# Patient Record
Sex: FEMALE | Employment: OTHER | URBAN - METROPOLITAN AREA
[De-identification: names, ages, dates, MRNs, and addresses within clinical notes are randomized per-mention and may not be internally consistent; named-entity substitution may affect disease eponyms.]

---

## 2017-05-01 ENCOUNTER — APPOINTMENT (OUTPATIENT)
Dept: CT IMAGING | Age: 47
End: 2017-05-01
Attending: NURSE PRACTITIONER
Payer: COMMERCIAL

## 2017-05-01 ENCOUNTER — APPOINTMENT (OUTPATIENT)
Dept: GENERAL RADIOLOGY | Age: 47
End: 2017-05-01
Attending: NURSE PRACTITIONER
Payer: COMMERCIAL

## 2017-05-01 ENCOUNTER — HOSPITAL ENCOUNTER (EMERGENCY)
Age: 47
Discharge: HOME | End: 2017-05-01
Attending: EMERGENCY MEDICINE
Payer: COMMERCIAL

## 2017-05-01 VITALS
SYSTOLIC BLOOD PRESSURE: 126 MMHG | HEART RATE: 78 BPM | WEIGHT: 142 LBS | OXYGEN SATURATION: 97 % | HEIGHT: 65 IN | RESPIRATION RATE: 16 BRPM | TEMPERATURE: 98.2 F | BODY MASS INDEX: 23.66 KG/M2 | DIASTOLIC BLOOD PRESSURE: 80 MMHG

## 2017-05-01 DIAGNOSIS — M79.604 ACUTE LEG PAIN, RIGHT: ICD-10-CM

## 2017-05-01 DIAGNOSIS — R68.84 JAW PAIN: ICD-10-CM
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
## 2017-05-01 DIAGNOSIS — S00.81XA FOREHEAD ABRASION, INITIAL ENCOUNTER: ICD-10-CM

## 2017-05-01 DIAGNOSIS — S09.90XA CHI (CLOSED HEAD INJURY), INITIAL ENCOUNTER: ICD-10-CM

## 2017-05-01 PROCEDURE — 70450 CT HEAD/BRAIN W/O DYE: CPT

## 2017-05-01 PROCEDURE — 74011250637 HC RX REV CODE- 250/637: Performed by: NURSE PRACTITIONER

## 2017-05-01 PROCEDURE — 99284 EMERGENCY DEPT VISIT MOD MDM: CPT

## 2017-05-01 PROCEDURE — 70486 CT MAXILLOFACIAL W/O DYE: CPT

## 2017-05-01 PROCEDURE — 72050 X-RAY EXAM NECK SPINE 4/5VWS: CPT

## 2017-05-01 PROCEDURE — 72125 CT NECK SPINE W/O DYE: CPT

## 2017-05-01 PROCEDURE — 73590 X-RAY EXAM OF LOWER LEG: CPT

## 2017-05-01 RX ORDER — HYDROCODONE BITARTRATE AND ACETAMINOPHEN 5; 325 MG/1; MG/1
1 TABLET ORAL ONCE
Status: COMPLETED | OUTPATIENT
Start: 2017-05-01 | End: 2017-05-01

## 2017-05-01 RX ORDER — CYCLOBENZAPRINE HCL 10 MG
10 TABLET ORAL
Qty: 15 TAB | Refills: 0 | Status: SHIPPED | OUTPATIENT
Start: 2017-05-01 | End: 2017-09-12

## 2017-05-01 RX ORDER — HYDROCODONE BITARTRATE AND ACETAMINOPHEN 5; 325 MG/1; MG/1
1 TABLET ORAL
Qty: 15 TAB | Refills: 0 | Status: SHIPPED | OUTPATIENT
Start: 2017-05-01 | End: 2017-05-12 | Stop reason: ALTCHOICE

## 2017-05-01 RX ORDER — MELATONIN
2000 DAILY
COMMUNITY
End: 2022-03-22 | Stop reason: ALTCHOICE

## 2017-05-01 RX ORDER — ACETAMINOPHEN 325 MG/1
TABLET ORAL
COMMUNITY
End: 2017-09-12

## 2017-05-01 RX ADMIN — HYDROCODONE BITARTRATE AND ACETAMINOPHEN 1 TABLET: 5; 325 TABLET ORAL at 12:24
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
## 2017-05-05 ENCOUNTER — OFFICE VISIT (OUTPATIENT)
Dept: INTERNAL MEDICINE CLINIC | Age: 47
End: 2017-05-05

## 2017-05-05 VITALS
DIASTOLIC BLOOD PRESSURE: 70 MMHG | OXYGEN SATURATION: 99 % | WEIGHT: 142 LBS | SYSTOLIC BLOOD PRESSURE: 120 MMHG | BODY MASS INDEX: 24.24 KG/M2 | TEMPERATURE: 97.4 F | HEIGHT: 64 IN | HEART RATE: 90 BPM
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
## 2017-05-05 DIAGNOSIS — S06.0X0A CONCUSSION, WITHOUT LOC, INITIAL ENCOUNTER: Primary | ICD-10-CM
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
## 2017-05-05 NOTE — LETTER
NOTIFICATION RETURN TO WORK / SCHOOL 
 
5/5/2017 10:26 AM 
 
Ms. Salud Ibanez [address] [place] [id number] To Whom It May Concern: 
 
Salud Ibanez is currently under the care of Hoa Gillespie. She will return to work/school on: May 15, 2017 If there are questions or concerns please have the patient contact our office. Sincerely, Jose Andres MD

## 2017-05-11 ENCOUNTER — OFFICE VISIT (OUTPATIENT)
Dept: INTERNAL MEDICINE CLINIC | Age: 47
End: 2017-05-11

## 2017-05-11 VITALS
WEIGHT: 144 LBS | HEIGHT: 64 IN | SYSTOLIC BLOOD PRESSURE: 122 MMHG | BODY MASS INDEX: 24.59 KG/M2 | RESPIRATION RATE: 18 BRPM | DIASTOLIC BLOOD PRESSURE: 70 MMHG | OXYGEN SATURATION: 99 % | HEART RATE: 69 BPM | TEMPERATURE: 97.9 F

## 2017-05-11 DIAGNOSIS — M54.2 NECK PAIN: Primary | ICD-10-CM

## 2017-05-11 DIAGNOSIS — S06.0X0D CONCUSSION, WITHOUT LOC, SUBSEQUENT ENCOUNTER: ICD-10-CM
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
## 2017-09-12 ENCOUNTER — OFFICE VISIT (OUTPATIENT)
Dept: INTERNAL MEDICINE CLINIC | Age: 47
End: 2017-09-12

## 2017-09-12 VITALS
WEIGHT: 145 LBS | HEIGHT: 65 IN | BODY MASS INDEX: 24.16 KG/M2 | OXYGEN SATURATION: 98 % | DIASTOLIC BLOOD PRESSURE: 86 MMHG | HEART RATE: 80 BPM | TEMPERATURE: 97.6 F | RESPIRATION RATE: 13 BRPM | SYSTOLIC BLOOD PRESSURE: 108 MMHG

## 2017-09-12 DIAGNOSIS — Z00.00 ROUTINE GENERAL MEDICAL EXAMINATION AT A HEALTH CARE FACILITY: Primary | ICD-10-CM

## 2017-09-12 PROBLEM — Z87.820 HISTORY OF CONCUSSION: Status: ACTIVE | Noted: 2017-09-12

## 2017-09-12 RX ORDER — LANOLIN ALCOHOL/MO/W.PET/CERES
800 CREAM (GRAM) TOPICAL DAILY
COMMUNITY
End: 2021-01-27 | Stop reason: ALTCHOICE

## 2017-09-12 RX ORDER — RIBOFLAVIN (VITAMIN B2) 400 MG
TABLET ORAL
COMMUNITY
End: 2022-03-22 | Stop reason: ALTCHOICE

## 2017-09-12 RX ORDER — CHLORZOXAZONE 500 MG/1
TABLET ORAL
COMMUNITY
Start: 2017-06-07 | End: 2017-09-12

## 2017-09-12 RX ORDER — AMITRIPTYLINE HYDROCHLORIDE 25 MG/1
TABLET, FILM COATED ORAL
COMMUNITY
Start: 2017-06-07 | End: 2017-09-12

## 2017-09-12 NOTE — PROGRESS NOTES
Complete Physical and Advance Care Planning (pt provided Advance Medical Directive today.)       HPI:  Johnathan Ibanez is a 56y.o. year old female who is here to establish care. She reports the following history and medical concerns: Wt Readings from Last 3 Encounters:   09/12/17 145 lb (65.8 kg)   05/11/17 144 lb (65.3 kg)   05/05/17 142 lb (64.4 kg)      S/p concussion. Saw Dr. Swapna Bergeron. Saw speech therapy. Back at work and doing ok. Saw GYN and increased to Vit d3 2000. Mammogram scheduled in Nov.    Wilda Cagle outside. Back is doing well. Javi Ace. Assessment and Plan        1. Routine general medical examination at a health care facility  Well exam.  Pt doing well with [facility] chiropractor  - METABOLIC PANEL, COMPREHENSIVE; Future  - CBC WITH AUTOMATED DIFF; Future  - LIPID PANEL; Future  - TSH 3RD GENERATION; Future  - URINALYSIS W/ RFLX MICROSCOPIC; Future  - VITAMIN D, 25 HYDROXY; Future        Visit Vitals    /86 (BP 1 Location: Left arm, BP Patient Position: Sitting)    Pulse 80    Temp 97.6 °F (36.4 °C) (Oral)    Resp 13    Ht 5' 5\" (1.651 m)    Wt 145 lb (65.8 kg)    SpO2 98%    BMI 24.13 kg/m2       Historical Data    Past Medical History:   Diagnosis Date    Previous back surgery [address] [place] [address]       Past Surgical History:   Procedure Laterality Date    ABDOMEN SURGERY PROC UNLISTED  2005    gallbladder    HX CHOLECYSTECTOMY      HX GYN  2004    hysterectomy    HX PARTIAL HYSTERECTOMY         Outpatient Encounter Prescriptions as of 9/12/2017   Medication Sig Dispense Refill    omega 3-dha-epa-fish oil (FISH OIL) 100-160-1,000 mg cap Take  by mouth.  riboflavin, vitamin B2, 400 mg tab Take  by mouth.  magnesium oxide (MAG-OX) 400 mg tablet Take 800 mg by mouth daily.  cholecalciferol (VITAMIN D3) 1,000 unit tablet Take 2,000 Units by mouth daily.  ASCORBIC ACID/MULTIVIT-MIN (EMERGEN-C PO) Take  by mouth daily.       [DISCONTINUED] amitriptyline (ELAVIL) 25 mg tablet       [DISCONTINUED] chlorzoxazone (PARAFON FORTE) 500 mg tablet       [DISCONTINUED] acetaminophen (TYLENOL) 325 mg tablet Take  by mouth every four (4) hours as needed for Pain.  [DISCONTINUED] cyclobenzaprine (FLEXERIL) 10 mg tablet Take 1 Tab by mouth three (3) times daily as needed for Muscle Spasm(s). 15 Tab 0     No facility-administered encounter medications on file as of 9/12/2017. Allergies   Allergen Reactions    Morphine Hives     itch        Social History     Social History    Marital status:      Spouse name: N/A    Number of children: N/A    Years of education: N/A     Occupational History    Not on file. Social History Main Topics    Smoking status: Never Smoker    Smokeless tobacco: Never Used    Alcohol use 3.0 oz/week     4 Glasses of wine per week      Comment: 4 glasses/ week    Drug use: No    Sexual activity: No     Other Topics Concern    Not on file     Social History Narrative    ** Merged History Encounter **             family history includes Cancer in her father; Stroke in her mother. Review of Systems   Constitutional: Negative for chills, diaphoresis, fever, malaise/fatigue and weight loss. HENT: Negative for hearing loss. Respiratory: Negative for cough. Cardiovascular: Negative for chest pain. Gastrointestinal: Negative for blood in stool and constipation. Genitourinary: Negative for dysuria, flank pain, frequency and urgency. Musculoskeletal: Negative for myalgias. Skin: Negative for rash. Neurological: Negative for dizziness, weakness and headaches. Endo/Heme/Allergies: Does not bruise/bleed easily. Physical Exam   Constitutional: She is oriented to person, place, and time. She appears well-nourished. No distress. Neck: Carotid bruit is not present. No thyromegaly present. Cardiovascular: Normal rate, regular rhythm and normal heart sounds. Pulmonary/Chest: Effort normal and breath sounds normal. No respiratory distress. She has no wheezes. Abdominal: Soft. Bowel sounds are normal. She exhibits no mass. There is no tenderness. Musculoskeletal: She exhibits no edema or tenderness. Lymphadenopathy:     She has no cervical adenopathy. Neurological: She is alert and oriented to person, place, and time. Skin: Skin is warm and dry. No rash noted. No erythema. Psychiatric: She has a normal mood and affect. Thought content normal.   Nursing note and vitals reviewed. Ortho Exam       Orders Placed This Encounter    METABOLIC PANEL, COMPREHENSIVE     Standing Status:   Future     Standing Expiration Date:   10/17/2018    CBC WITH AUTOMATED DIFF     Standing Status:   Future     Standing Expiration Date:   10/17/2018    LIPID PANEL     Standing Status:   Future     Standing Expiration Date:   10/17/2018    TSH 3RD GENERATION     Standing Status:   Future     Standing Expiration Date:   10/17/2018    URINALYSIS W/ RFLX MICROSCOPIC     Standing Status:   Future     Standing Expiration Date:   10/17/2018    VITAMIN D, 25 HYDROXY     Standing Status:   Future     Standing Expiration Date:   10/17/2018    DISCONTD: amitriptyline (ELAVIL) 25 mg tablet    DISCONTD: chlorzoxazone (PARAFON FORTE) 500 mg tablet    omega 3-dha-epa-fish oil (FISH OIL) 100-160-1,000 mg cap     Sig: Take  by mouth.  riboflavin, vitamin B2, 400 mg tab     Sig: Take  by mouth.  magnesium oxide (MAG-OX) 400 mg tablet     Sig: Take 800 mg by mouth daily. I have reviewed the patient's medical history in detail and updated the computerized patient record. We had a prolonged discussion about these complex clinical issues and went over the various important aspects to consider. All questions were answered.      Advised her to call back or return to office if symptoms do not improve, change in nature, or persist.    She was given an after visit summary or informed of [employer] Access which includes patient instructions, diagnoses, current medications, & vitals. She expressed understanding with the diagnosis and plan.

## 2017-09-12 NOTE — PROGRESS NOTES
Chief Complaint   Patient presents with    Complete Physical    Advance Care Planning     pt provided Advance Medical Directive today. 1. Have you been to the ER, urgent care clinic since your last visit? Hospitalized since your last visit? No    2. Have you seen or consulted any other health care providers outside of the [address] [place] [address] since your last visit? Include any pap smears or colon screening.  Yes GYN & derm

## 2019-01-21 DIAGNOSIS — Z00.00 ROUTINE GENERAL MEDICAL EXAMINATION AT A HEALTH CARE FACILITY: Primary | ICD-10-CM

## 2019-02-18 ENCOUNTER — OFFICE VISIT (OUTPATIENT)
Dept: INTERNAL MEDICINE CLINIC | Age: 49
End: 2019-02-18

## 2019-02-18 VITALS
HEIGHT: 65 IN | SYSTOLIC BLOOD PRESSURE: 108 MMHG | WEIGHT: 147 LBS | RESPIRATION RATE: 16 BRPM | HEART RATE: 77 BPM | DIASTOLIC BLOOD PRESSURE: 78 MMHG | OXYGEN SATURATION: 98 % | TEMPERATURE: 98.4 F | BODY MASS INDEX: 24.49 KG/M2

## 2019-02-18 DIAGNOSIS — S90.851A: ICD-10-CM

## 2019-02-18 DIAGNOSIS — R79.89 ABNORMAL TSH: ICD-10-CM

## 2019-02-18 DIAGNOSIS — Z00.00 ROUTINE GENERAL MEDICAL EXAMINATION AT A HEALTH CARE FACILITY: Primary | ICD-10-CM

## 2019-02-18 RX ORDER — ATOVAQUONE AND PROGUANIL HYDROCHLORIDE 250; 100 MG/1; MG/1
4 TABLET, FILM COATED ORAL DAILY
Qty: 24 TAB | Refills: 0 | Status: SHIPPED | OUTPATIENT
Start: 2019-02-18 | End: 2021-01-27 | Stop reason: ALTCHOICE

## 2019-02-18 RX ORDER — AZITHROMYCIN 250 MG/1
TABLET, FILM COATED ORAL
Qty: 6 TAB | Refills: 0 | Status: SHIPPED | OUTPATIENT
Start: 2019-02-18 | End: 2019-02-23

## 2019-02-18 NOTE — PROGRESS NOTES
Eldred Bloch Wingate is a 53 y.o. female Chief Complaint Patient presents with  Complete Physical  
 
1. Have you been to the ER, urgent care clinic since your last visit? Hospitalized since your last visit? No 
 
2. Have you seen or consulted any other health care providers outside of the [address] [place] [address] since your last visit? Include any pap smears or colon screening. No  
 
Visit Vitals /78 (BP 1 Location: Left arm, BP Patient Position: Sitting) Pulse 77 Temp 98.4 °F (36.9 °C) (Oral) Resp 16 Ht 5' 5\" (1.651 m) Wt 147 lb (66.7 kg) SpO2 98% BMI 24.46 kg/m² Health Maintenance Due Topic Date Due  Influenza Age 5 to Adult  08/01/2018 Declines influenza vaccine.  
 
Rodri Root LPN

## 2019-02-18 NOTE — PATIENT INSTRUCTIONS
Reviewed:  Symptoms of hypothyroid are fatigue, low appetite, weight gain, slow metabolism, constipation, feeling cold when others are not, heavy periods in females, edema, loss of hair and mental slowness. Overactive thyroid would be the opposite such as restlessness but can still have fatigue, increased appetite, weight loss, loose stools, scant periods in females, feeling hot all of the time. Let us know if any of the above in consistency. Telephone on 01/10/2019 Component Date Value Ref Range Status  WBC 02/12/2019 7.4  3.4 - 10.8 x10E3/uL Final  
 RBC 02/12/2019 4.43  3.77 - 5.28 x10E6/uL Final  
 HGB 02/12/2019 13.7  11.1 - 15.9 g/dL Final  
 HCT 02/12/2019 40.4  34.0 - 46.6 % Final  
 MCV 02/12/2019 91  79 - 97 fL Final  
 MCH 02/12/2019 30.9  26.6 - 33.0 pg Final  
 MCHC 02/12/2019 33.9  31.5 - 35.7 g/dL Final  
 RDW 02/12/2019 13.7  12.3 - 15.4 % Final  
 PLATELET 56/76/3834 495  150 - 379 x10E3/uL Final  
 NEUTROPHILS 02/12/2019 64  Not Estab. % Final  
 Lymphocytes 02/12/2019 26  Not Estab. % Final  
 MONOCYTES 02/12/2019 8  Not Estab. % Final  
 EOSINOPHILS 02/12/2019 2  Not Estab. % Final  
 BASOPHILS 02/12/2019 0  Not Estab. % Final  
 ABS. NEUTROPHILS 02/12/2019 4.7  1.4 - 7.0 x10E3/uL Final  
 Abs Lymphocytes 02/12/2019 1.9  0.7 - 3.1 x10E3/uL Final  
 ABS. MONOCYTES 02/12/2019 0.6  0.1 - 0.9 x10E3/uL Final  
 ABS. EOSINOPHILS 02/12/2019 0.2  0.0 - 0.4 x10E3/uL Final  
 ABS. BASOPHILS 02/12/2019 0.0  0.0 - 0.2 x10E3/uL Final  
 IMMATURE GRANULOCYTES 02/12/2019 0  Not Estab. % Final  
 ABS. IMM.  GRANS. 02/12/2019 0.0  0.0 - 0.1 x10E3/uL Final  
 Cholesterol, total 02/12/2019 165  100 - 199 mg/dL Final  
 Triglyceride 02/12/2019 49  0 - 149 mg/dL Final  
 HDL Cholesterol 02/12/2019 56  >39 mg/dL Final  
 VLDL, calculated 02/12/2019 10  5 - 40 mg/dL Final  
 LDL, calculated 02/12/2019 99  0 - 99 mg/dL Final  
 TSH 02/12/2019 0.444* 0.450 - 4.500 uIU/mL Final  
  Glucose 02/12/2019 83  65 - 99 mg/dL Final  
 BUN 02/12/2019 12  6 - 24 mg/dL Final  
 Creatinine 02/12/2019 0.79  0.57 - 1.00 mg/dL Final  
 GFR est non-AA 02/12/2019 89  >59 mL/min/1.73 Final  
 GFR est AA 02/12/2019 103  >59 mL/min/1.73 Final  
 BUN/Creatinine ratio 02/12/2019 15  9 - 23 Final  
 Sodium 02/12/2019 140  134 - 144 mmol/L Final  
 Potassium 02/12/2019 5.0  3.5 - 5.2 mmol/L Final  
 Chloride 02/12/2019 104  96 - 106 mmol/L Final  
 CO2 02/12/2019 22  20 - 29 mmol/L Final  
 Calcium 02/12/2019 9.7  8.7 - 10.2 mg/dL Final  
 Protein, total 02/12/2019 6.9  6.0 - 8.5 g/dL Final  
 Albumin 02/12/2019 4.4  3.5 - 5.5 g/dL Final  
 GLOBULIN, TOTAL 02/12/2019 2.5  1.5 - 4.5 g/dL Final  
 A-G Ratio 02/12/2019 1.8  1.2 - 2.2 Final  
 Bilirubin, total 02/12/2019 0.6  0.0 - 1.2 mg/dL Final  
 Alk. phosphatase 02/12/2019 80  39 - 117 IU/L Final  
 AST (SGOT) 02/12/2019 19  0 - 40 IU/L Final  
 ALT (SGPT) 02/12/2019 13  0 - 32 IU/L Final  
 Creatinine, urine 02/12/2019 109.8  Not Estab. mg/dL Final  
 Microalbumin, urine 02/12/2019 <3.0  Not Estab. ug/mL Final  
 Microalb/Creat ratio (ug/mg creat.) 02/12/2019 <2.7  0.0 - 30.0 mg/g creat Final  
 Comment:                      Normal:                0.0 -  30.0 Albuminuria:          31.0 - 300.0 Clinical albuminuria:       >300.0  Specific Gravity 02/12/2019 1.019  1.005 - 1.030 Final  
 pH (UA) 02/12/2019 6.5  5.0 - 7.5 Final  
 Color 02/12/2019 Yellow  Yellow Final  
 Appearance 02/12/2019 Clear  Clear Final  
 Leukocyte Esterase 02/12/2019 Negative  Negative Final  
 Protein 02/12/2019 Negative  Negative/Trace Final  
 Glucose 02/12/2019 Negative  Negative Final  
 Ketone 02/12/2019 Negative  Negative Final  
 Blood 02/12/2019 Negative  Negative Final  
 Bilirubin 02/12/2019 Negative  Negative Final  
 Urobilinogen 02/12/2019 0.2  0.2 - 1.0 mg/dL Final  
  Nitrites 02/12/2019 Negative  Negative Final  
 Microscopic Examination 02/12/2019 Comment   Final  
 Microscopic follows if indicated.  Microscopic exam 02/12/2019 See additional order   Final  
 Microscopic was indicated and was performed.  URINALYSIS REFLEX 02/12/2019 Comment   Final  
 This specimen will not reflex to a Urine Culture.  WBC 02/12/2019 0-5  0 - 5 /hpf Final  
 RBC 02/12/2019 0-2  0 - 2 /hpf Final  
 Epithelial cells 02/12/2019 0-10  0 - 10 /hpf Final  
 Casts 02/12/2019 None seen  None seen /lpf Final  
 Mucus 02/12/2019 Present  Not Estab. Final  
 Bacteria 02/12/2019 Few  None seen/Few Final  
 INTERPRETATION 02/12/2019 Note   Final  
 Supplemental report is available. NUUN electrolyte tabs Align probiotic Take 4 tablets of malarone for 3 days per treatment

## 2020-11-02 ENCOUNTER — TELEPHONE (OUTPATIENT)
Dept: INTERNAL MEDICINE CLINIC | Age: 50
End: 2020-11-02

## 2020-11-02 NOTE — TELEPHONE ENCOUNTER
----- Message from Austin Dickey sent at 11/2/2020 11:59 AM EST -----  Regarding: Dr. Glen Johnston  Appointment not available    Caller's first and last name and relationship to patient (if not the patient): pt      Best contact number: [phone]      Preferred date and time: Soon      Scheduled appointment date and time: None found      Reason for appointment: NP appt      Details to clarify the request: Requesting a virtual appt      Message copied/pasted from [facility]

## 2021-01-26 PROBLEM — R79.89 LOW TSH LEVEL: Status: ACTIVE | Noted: 2021-01-26

## 2021-01-27 ENCOUNTER — VIRTUAL VISIT (OUTPATIENT)
Dept: INTERNAL MEDICINE CLINIC | Age: 51
End: 2021-01-27
Payer: MEDICAID

## 2021-01-27 DIAGNOSIS — R79.89 LOW TSH LEVEL: ICD-10-CM

## 2021-01-27 DIAGNOSIS — L30.9 DERMATITIS: ICD-10-CM

## 2021-01-27 DIAGNOSIS — Z12.11 COLON CANCER SCREENING: ICD-10-CM

## 2021-01-27 DIAGNOSIS — Z00.00 ROUTINE GENERAL MEDICAL EXAMINATION AT A HEALTH CARE FACILITY: Primary | ICD-10-CM

## 2021-01-27 PROBLEM — N80.9 ENDOMETRIOSIS: Status: ACTIVE | Noted: 2021-01-27

## 2021-01-27 PROCEDURE — 99386 PREV VISIT NEW AGE 40-64: CPT | Performed by: INTERNAL MEDICINE

## 2021-01-27 RX ORDER — CETIRIZINE HCL 10 MG
10 TABLET ORAL
COMMUNITY
End: 2022-03-22 | Stop reason: ALTCHOICE

## 2021-01-27 NOTE — PATIENT INSTRUCTIONS
This is an established visit conducted via telemedicine. The patient has been instructed that this meets HIPAA criteria and acknowledges and agrees to this method of visitation.  
 
Radha Pascual [place] 
87/91/97 
9:87 AM
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
## 2021-02-06 LAB
ALBUMIN SERPL-MCNC: 4.6 G/DL (ref 3.8–4.8)
ALBUMIN/GLOB SERPL: 1.6 {RATIO} (ref 1.2–2.2)
ALP SERPL-CCNC: 95 IU/L (ref 39–117)
ALT SERPL-CCNC: 21 IU/L (ref 0–32)
AST SERPL-CCNC: 29 IU/L (ref 0–40)
BILIRUB SERPL-MCNC: 0.6 MG/DL (ref 0–1.2)
BUN SERPL-MCNC: 20 MG/DL (ref 6–24)
BUN/CREAT SERPL: 23 (ref 9–23)
CALCIUM SERPL-MCNC: 10.1 MG/DL (ref 8.7–10.2)
CHLORIDE SERPL-SCNC: 101 MMOL/L (ref 96–106)
CHOLEST SERPL-MCNC: 220 MG/DL (ref 100–199)
CO2 SERPL-SCNC: 22 MMOL/L (ref 20–29)
CREAT SERPL-MCNC: 0.87 MG/DL (ref 0.57–1)
ERYTHROCYTE [DISTWIDTH] IN BLOOD BY AUTOMATED COUNT: 11.6 % (ref 11.7–15.4)
GLOBULIN SER CALC-MCNC: 2.9 G/DL (ref 1.5–4.5)
GLUCOSE SERPL-MCNC: 71 MG/DL (ref 65–99)
HCT VFR BLD AUTO: 42.4 % (ref 34–46.6)
HDLC SERPL-MCNC: 59 MG/DL
HGB BLD-MCNC: 14.7 G/DL (ref 11.1–15.9)
LDLC SERPL CALC-MCNC: 151 MG/DL (ref 0–99)
MCH RBC QN AUTO: 31.5 PG (ref 26.6–33)
MCHC RBC AUTO-ENTMCNC: 34.7 G/DL (ref 31.5–35.7)
MCV RBC AUTO: 91 FL (ref 79–97)
PLATELET # BLD AUTO: 217 X10E3/UL (ref 150–450)
POTASSIUM SERPL-SCNC: 4.8 MMOL/L (ref 3.5–5.2)
PROT SERPL-MCNC: 7.5 G/DL (ref 6–8.5)
RBC # BLD AUTO: 4.67 X10E6/UL (ref 3.77–5.28)
SODIUM SERPL-SCNC: 138 MMOL/L (ref 134–144)
TRIGL SERPL-MCNC: 56 MG/DL (ref 0–149)
TSH SERPL DL<=0.005 MIU/L-ACNC: 0.56 UIU/ML (ref 0.45–4.5)
VLDLC SERPL CALC-MCNC: 10 MG/DL (ref 5–40)
WBC # BLD AUTO: 7.1 X10E3/UL (ref 3.4–10.8)

## 2021-02-09 ENCOUNTER — DOCUMENTATION ONLY (OUTPATIENT)
Dept: INTERNAL MEDICINE CLINIC | Age: 51
End: 2021-02-09

## 2021-02-09 NOTE — ACP (ADVANCE CARE PLANNING)
Patient mailed copy of her ACP to be filed in her chart. Copy given to Lucille Melendez to Scan in her chart.

## 2022-03-18 PROBLEM — N80.9 ENDOMETRIOSIS: Status: ACTIVE | Noted: 2021-01-27

## 2022-03-19 PROBLEM — Z87.820 HISTORY OF CONCUSSION: Status: ACTIVE | Noted: 2017-09-12

## 2022-03-19 PROBLEM — R79.89 LOW TSH LEVEL: Status: ACTIVE | Noted: 2021-01-26

## 2022-03-22 ENCOUNTER — OFFICE VISIT (OUTPATIENT)
Dept: INTERNAL MEDICINE CLINIC | Age: 52
End: 2022-03-22
Payer: MEDICAID

## 2022-03-22 VITALS
DIASTOLIC BLOOD PRESSURE: 70 MMHG | WEIGHT: 140 LBS | HEIGHT: 65 IN | SYSTOLIC BLOOD PRESSURE: 110 MMHG | RESPIRATION RATE: 16 BRPM | BODY MASS INDEX: 23.32 KG/M2 | TEMPERATURE: 97.3 F | OXYGEN SATURATION: 100 % | HEART RATE: 65 BPM

## 2022-03-22 DIAGNOSIS — Z20.828 EXPOSURE TO MEASLES VIRUS: ICD-10-CM

## 2022-03-22 DIAGNOSIS — E78.00 PURE HYPERCHOLESTEROLEMIA: ICD-10-CM

## 2022-03-22 DIAGNOSIS — Z12.11 COLON CANCER SCREENING: ICD-10-CM

## 2022-03-22 DIAGNOSIS — Z00.00 ROUTINE GENERAL MEDICAL EXAMINATION AT A HEALTH CARE FACILITY: Primary | ICD-10-CM

## 2022-03-22 DIAGNOSIS — R63.4 WEIGHT LOSS: ICD-10-CM

## 2022-03-22 PROCEDURE — 99396 PREV VISIT EST AGE 40-64: CPT | Performed by: INTERNAL MEDICINE

## 2022-03-22 NOTE — PROGRESS NOTES
1. \"Have you been to the ER, urgent care clinic since your last visit? Hospitalized since your last visit? \" No    2. \"Have you seen or consulted any other health care providers outside of the [address] [place] [address] since your last visit? \"  No     3. For patients aged 39-70: Has the patient had a colonoscopy / FIT/ Cologuard? No      If the patient is female:    4. For patients aged 41-77: Has the patient had a mammogram within the past 2 years? Yes, [facility]'s 3-11-22      5. For patients aged 21-65: Has the patient had a pap smear?   Americo Mercedes, [facility] March 11,2022

## 2022-03-22 NOTE — PATIENT INSTRUCTIONS
Office Policies    Phone calls/patient messages:            Please allow up to 24 hours for someone in the office to contact you about your call or message. Be mindful your provider may be out of the office or your message may require further review. We encourage you to use "[employer] for your messages as this is a faster, more efficient way to communicate with our office                         Medication Refills:            Prescription medications require 48-72 business hours to process. We encourage you to use "[employer] for your refills. For controlled medications: Please allow 72 business hours to process. Certain medications may require you to  a written prescription at our office. NO narcotic/controlled medications will be prescribed after 4pm Monday through Friday or on weekends              Form/Paperwork Completion:            Please note a $25 fee may incur for all paperwork for completed by our providers. We ask that you allow 7-10 business days. Pre-payment is due prior to picking up/faxing the completed form. You may also download your forms to "[employer] to have your doctor print off.
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
## 2022-03-24 ENCOUNTER — APPOINTMENT (OUTPATIENT)
Dept: INTERNAL MEDICINE CLINIC | Age: 52
End: 2022-03-24

## 2022-03-25 LAB
ALBUMIN SERPL-MCNC: 4.9 G/DL (ref 3.8–4.9)
ALBUMIN/GLOB SERPL: 1.7 {RATIO} (ref 1.2–2.2)
ALP SERPL-CCNC: 93 IU/L (ref 44–121)
ALT SERPL-CCNC: 20 IU/L (ref 0–32)
AST SERPL-CCNC: 27 IU/L (ref 0–40)
BILIRUB SERPL-MCNC: 0.5 MG/DL (ref 0–1.2)
BUN SERPL-MCNC: 22 MG/DL (ref 6–24)
BUN/CREAT SERPL: 25 (ref 9–23)
CALCIUM SERPL-MCNC: 10.2 MG/DL (ref 8.7–10.2)
CHLORIDE SERPL-SCNC: 101 MMOL/L (ref 96–106)
CHOLEST SERPL-MCNC: 218 MG/DL (ref 100–199)
CO2 SERPL-SCNC: 21 MMOL/L (ref 20–29)
CREAT SERPL-MCNC: 0.87 MG/DL (ref 0.57–1)
EGFR: 81 ML/MIN/1.73
ERYTHROCYTE [DISTWIDTH] IN BLOOD BY AUTOMATED COUNT: 12.8 % (ref 11.7–15.4)
GLOBULIN SER CALC-MCNC: 2.9 G/DL (ref 1.5–4.5)
GLUCOSE SERPL-MCNC: 83 MG/DL (ref 65–99)
HCT VFR BLD AUTO: 46.3 % (ref 34–46.6)
HDLC SERPL-MCNC: 68 MG/DL
HGB BLD-MCNC: 15.4 G/DL (ref 11.1–15.9)
LDLC SERPL CALC-MCNC: 143 MG/DL (ref 0–99)
MCH RBC QN AUTO: 30.7 PG (ref 26.6–33)
MCHC RBC AUTO-ENTMCNC: 33.3 G/DL (ref 31.5–35.7)
MCV RBC AUTO: 92 FL (ref 79–97)
MEV IGG SER IA-ACNC: 182 AU/ML
PLATELET # BLD AUTO: 238 X10E3/UL (ref 150–450)
POTASSIUM SERPL-SCNC: 4.7 MMOL/L (ref 3.5–5.2)
PROT SERPL-MCNC: 7.8 G/DL (ref 6–8.5)
RBC # BLD AUTO: 5.01 X10E6/UL (ref 3.77–5.28)
SODIUM SERPL-SCNC: 140 MMOL/L (ref 134–144)
TRIGL SERPL-MCNC: 41 MG/DL (ref 0–149)
TSH SERPL DL<=0.005 MIU/L-ACNC: 0.68 UIU/ML (ref 0.45–4.5)
VLDLC SERPL CALC-MCNC: 7 MG/DL (ref 5–40)
WBC # BLD AUTO: 5.2 X10E3/UL (ref 3.4–10.8)

## 2022-03-27 NOTE — PROGRESS NOTES
Complete Physical 
  
HPI: 
Charu Henao is a 53y.o. year old female who is here for a physical: She reports the following history and medical concerns:   
 
Abnormal TSH at 0.444 Left foot- flip flips on. Itching a lot. Area on right foot on bottom. Doesn't remember trauma. Happened in [place]. Showed picture of a wound with yellow center and white surrounding. It healed on own. Now red but still itching Reviewed labs Will live in [place]. Uses mosquito net Has doctor that lives around there. Assessment and Plan 1. Routine general medical examination at a health care facility Well exam.  Pt lives in [place]. Gave rx for antibiotics zpack and treatment dose for malaria 2. Abnormal TSH No sx. Reviewed. Just slightly low TSH. Gave sx list 
 
3. Superficial foreign body of right foot without major open wound and without infection, initial encounter Area cleaned with alcohol. With scalpel area scraped. Old callus scar. No identifiable foreign body. No pain when pressure dark red spot likely blister that hardened. Call if not healed before she leaves. Visit Vitals /78 (BP 1 Location: Left arm, BP Patient Position: Sitting) Pulse 77 Temp 98.4 °F (36.9 °C) (Oral) Resp 16 Ht 5' 5\" (1.651 m) Wt 147 lb (66.7 kg) SpO2 98% BMI 24.46 kg/m² Historical Data Past Medical History:  
Diagnosis Date  Previous back surgery 7 [facility] Past Surgical History:  
Procedure Laterality Date  ABDOMEN SURGERY PROC UNLISTED  2005  
 gallbladder  HX CHOLECYSTECTOMY [facility]  2004  
 hysterectomy  HX PARTIAL HYSTERECTOMY Outpatient Encounter Medications as of 2/18/2019 Medication Sig Dispense Refill  azithromycin (ZITHROMAX) 250 mg tablet TAKE 2 TABS THE FIRST DAY, AND THEN 1 TAB DAILY FOR 4 MORE DAYS 6 Tab 0  
 atovaquone-proguanil (MALARONE) 250-100 mg per tablet Take 4 Tabs by mouth daily.  24 Tab 0  
  omega 3-dha-epa-fish oil (FISH OIL) 100-160-1,000 mg cap Take  by mouth.  riboflavin, vitamin B2, 400 mg tab Take  by mouth.  magnesium oxide (MAG-OX) 400 mg tablet Take 800 mg by mouth daily.  cholecalciferol (VITAMIN D3) 1,000 unit tablet Take 2,000 Units by mouth daily.  ASCORBIC ACID/MULTIVIT-MIN (EMERGEN-C PO) Take  by mouth daily. No facility-administered encounter medications on file as of 2/18/2019. Allergies Allergen Reactions  Morphine Hives  
  itch Social History Socioeconomic History  Marital status:  Spouse name: Not on file  Number of children: Not on file  Years of education: Not on file  Highest education level: Not on file Social Needs  Financial resource strain: Not on file  Food insecurity - worry: Not on file  Food insecurity - inability: Not on file  Transportation needs - medical: Not on file  Transportation needs - non-medical: Not on file Occupational History  Not on file Tobacco Use  Smoking status: Never Smoker  Smokeless tobacco: Never Used Substance and Sexual Activity  Alcohol use: Yes Alcohol/week: 3.0 oz Types: 4 Glasses of wine per week Comment: 4 glasses/ week  Drug use: No  
 Sexual activity: No  
Other Topics Concern  Not on file Social History Narrative ** Merged History Encounter **  
    
  
 
family history includes Cancer in her father; Stroke in her mother. Review of Systems Constitutional: Negative for chills, diaphoresis, fever, malaise/fatigue and weight loss. HENT: Negative for hearing loss. Eyes: Negative for blurred vision. Respiratory: Negative for cough and shortness of breath. Cardiovascular: Negative for chest pain. Gastrointestinal: Negative for abdominal pain, blood in stool and constipation. Genitourinary: Negative for dysuria, flank pain, frequency and urgency. Musculoskeletal: Negative for myalgias. Skin: Negative for rash. Neurological: Negative for dizziness, focal weakness, weakness and headaches. Endo/Heme/Allergies: Negative for environmental allergies. Does not bruise/bleed easily. Physical Exam  
Constitutional: She is oriented to person, place, and time. She appears well-developed and well-nourished. No distress. HENT:  
Mouth/Throat: No oropharyngeal exudate. Eyes: Conjunctivae are normal. Pupils are equal, round, and reactive to light. Left eye exhibits no discharge. No scleral icterus. Neck: No thyromegaly present. Cardiovascular: Normal rate, regular rhythm and normal heart sounds. Exam reveals no gallop and no friction rub. No murmur heard. Pulmonary/Chest: No respiratory distress. Abdominal: She exhibits no distension. Musculoskeletal: Normal range of motion. She exhibits no edema or deformity. Feet: 
 
Feet:  
Right Foot:  
Skin Integrity: Positive for blister, skin breakdown, warmth, callus and dry skin. Negative for ulcer or erythema. Lymphadenopathy:  
  She has no cervical adenopathy. Neurological: She is alert and oriented to person, place, and time. Skin: Skin is warm and dry. No erythema. Psychiatric: She has a normal mood and affect. Judgment and thought content normal.  
Nursing note and vitals reviewed. Ortho Exam  
 
 
Orders Placed This Encounter  azithromycin (ZITHROMAX) 250 mg tablet Sig: TAKE 2 TABS THE FIRST DAY, AND THEN 1 TAB DAILY FOR 4 MORE DAYS Dispense:  6 Tab Refill:  0  
 atovaquone-proguanil (MALARONE) 250-100 mg per tablet Sig: Take 4 Tabs by mouth daily. Dispense:  24 Tab Refill:  0 I have reviewed the patient's medical history in detail and updated the computerized patient record. We had a prolonged discussion about these complex clinical issues and went over the various important aspects to consider. All questions were answered. Advised her to call back or return to office if symptoms do not improve, change in nature, or persist. 
 
She was given an after visit summary or informed of [employer] which includes patient instructions, diagnoses, current medications, & vitals. She expressed understanding with the diagnosis and plan. ISTOP

## 2022-03-30 ENCOUNTER — DOCUMENTATION ONLY (OUTPATIENT)
Dept: INTERNAL MEDICINE CLINIC | Age: 52
End: 2022-03-30

## 2022-03-30 LAB — HEMOCCULT STL QL IA: NEGATIVE

## 2022-11-09 ENCOUNTER — OFFICE VISIT (OUTPATIENT)
Dept: INTERNAL MEDICINE CLINIC | Age: 52
End: 2022-11-09
Payer: MEDICAID

## 2022-11-09 VITALS
HEIGHT: 65 IN | DIASTOLIC BLOOD PRESSURE: 76 MMHG | HEART RATE: 70 BPM | TEMPERATURE: 98.2 F | BODY MASS INDEX: 23.82 KG/M2 | SYSTOLIC BLOOD PRESSURE: 110 MMHG | WEIGHT: 143 LBS | RESPIRATION RATE: 16 BRPM | OXYGEN SATURATION: 100 %

## 2022-11-09 DIAGNOSIS — R22.31 LOCALIZED SWELLING OF FINGER OF RIGHT HAND: Primary | ICD-10-CM

## 2022-11-09 PROCEDURE — 99213 OFFICE O/P EST LOW 20 MIN: CPT | Performed by: STUDENT IN AN ORGANIZED HEALTH CARE EDUCATION/TRAINING PROGRAM

## 2022-11-09 NOTE — PROGRESS NOTES
[facility]   Internal Medicine  [address], [address] [facility]  [address]  [place], [address] [facility]  [phone]      Primary Care Visit Note    Assessment/Plan:     Localized swelling on R 3rd PIP joint: likely ganglion cyst vs. Mucous cyst  - continue observation   - if not improving consider removal      Yariel Yeager MD    CC:     Chief Complaint   Patient presents with    Finger Swelling     Right middle finger       HPI:     Eduardo Ibanez is a 47 y.o. female who presents for evaluation of localized swelling of her R 3rd knuckle. - patient noticed lump on her finger about 2 months ago. She is not sure if it came on gradually or all at once. - has gotten slightly larger over last 2 months.   - had been some redness in the past but not currently. - has some pain with it but minimal today. 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 ROS:   All 10 point review of systems negative except those mentioned in the HPI. Past Medical History: Active Ambulatory Problems     Diagnosis Date Noted    Neck pain 10/27/2016    History of concussion 09/12/2017    Low TSH level 01/26/2021    Endometriosis 01/27/2021    Patient is Oriental orthodox 01/27/2021     Resolved Ambulatory Problems     Diagnosis Date Noted    No Resolved Ambulatory Problems     Past Medical History:   Diagnosis Date    Previous back surgery 1995 & 1999          Current Medications:   No current outpatient medications on file.       Past Surgical History:     Past Surgical History:   Procedure Laterality Date    HX CHOLECYSTECTOMY      HX GYN  2004    hysterectomy    HX ORTHOPAEDIC      back surgery x2 --lumbar fusion and repair    HX PARTIAL HYSTERECTOMY      [place] ABDOMEN SURGERY PROC UNLISTED  2005    gallbladder         Family History:     Family History   Problem Relation Age of Onset    Stroke Mother Dementia Mother     Cancer Father         lung cancer         Social History:     Social History     Socioeconomic History    Marital status:      Spouse name: Not on file    Number of children: 1    Years of education: Not on file    Highest education level: Not on file   Occupational History    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 [employer]   Tobacco Use    Smoking status: Never    Smokeless tobacco: Never   Substance and Sexual Activity    Alcohol use: Yes     Alcohol/week: 5.0 standard drinks     Types: 4 Glasses of wine per week     Comment: 4 glasses/ week    Drug use: No    Sexual activity: Never   Other Topics Concern    Not on file   Social History Narrative    ** Merged History Encounter **          Social Determinants of Health     Financial Resource Strain: Low Risk     Difficulty of Paying Living Expenses: Not very hard   Food Insecurity: No Food Insecurity    Worried About Running Out of Food in the Last Year: Never true    Ran Out of Food in the Last Year: Never true   Transportation Needs: Not on file   Physical Activity: Not on file   Stress: Not on file   Social Connections: Not on file   Intimate Partner Violence: Not on file   Housing Stability: Not on file            Visit Vitals  /76   Pulse 70   Temp 98.2 °F (36.8 °C) (Temporal)   Resp 16   Ht 5' 5\" (1.651 m)   Wt 143 lb (64.9 kg)   SpO2 100%   BMI 23.80 kg/m²       Physical Exam:   General - Well appearing   HEENT - eomi, non-icteric sclera  Pulm - normal wob  Cardio - hemodynamically stable  Abd - benign  Extrem - no edema  MSK: very soft, compressible, mobile mass on dorsal 3rd PIP joint. Minimal pain with palpation, no erythema. 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    Neuro-  Alert and oriented, No focal deficits      Labs/Imaging:     Labs and imaging reviewed by me and significant for:    Lab Results   Component Value Date/Time    WBC 5.2 03/24/2022 08:45 AM    HGB 15.4 03/24/2022 08:45 AM    HCT 46.3 03/24/2022 08:45 AM    PLATELET 353 55/01/5606 08:45 AM    MCV 92 03/24/2022 08:45 AM     Lab Results   Component Value Date/Time    Sodium 140 03/24/2022 08:45 AM    Potassium 4.7 03/24/2022 08:45 AM    Chloride 101 03/24/2022 08:45 AM    CO2 21 03/24/2022 08:45 AM    Glucose 83 03/24/2022 08:45 AM    BUN 22 03/24/2022 08:45 AM    Creatinine 0.87 03/24/2022 08:45 AM    BUN/Creatinine ratio 25 (H) 03/24/2022 08:45 AM    GFR est AA 90 02/05/2021 09:47 AM    GFR est non-AA 78 02/05/2021 09:47 AM    Calcium 10.2 03/24/2022 08:45 AM    Bilirubin, total 0.5 03/24/2022 08:45 AM    Alk.  phosphatase 93 03/24/2022 08:45 AM    Protein, total 7.8 03/24/2022 08:45 AM    Albumin 4.9 03/24/2022 08:45 AM    A-G Ratio 1.7 03/24/2022 08:45 AM    ALT (SGPT) 20 03/24/2022 08:45 AM    AST (SGOT) 27 03/24/2022 08:45 AM

## 2022-11-09 NOTE — PROGRESS NOTES
1. \"Have you been to the ER, urgent care clinic since your last visit? Hospitalized since your last visit? \" No    2. \"Have you seen or consulted any other health care providers outside of the [address] [place] [address] since your last visit? \" No     3. For patients aged 39-70: Has the patient had a colonoscopy / FIT/ Cologuard? Yes - no Care Gap present      If the patient is female:    4. For patients aged 41-77: Has the patient had a mammogram within the past 2 years? Yes - no Care Gap present      5. For patients aged 21-65: Has the patient had a pap smear?  Yes - no Care Gap present

## 2023-05-18 ASSESSMENT — ENCOUNTER SYMPTOMS
EYES NEGATIVE: 1
GASTROINTESTINAL NEGATIVE: 1
RESPIRATORY NEGATIVE: 1

## 2023-05-19 ENCOUNTER — OFFICE VISIT (OUTPATIENT)
Age: 53
End: 2023-05-19
Payer: MEDICAID

## 2023-05-19 VITALS
HEIGHT: 65 IN | DIASTOLIC BLOOD PRESSURE: 76 MMHG | SYSTOLIC BLOOD PRESSURE: 110 MMHG | OXYGEN SATURATION: 99 % | TEMPERATURE: 97.2 F | WEIGHT: 142.6 LBS | BODY MASS INDEX: 23.76 KG/M2 | HEART RATE: 63 BPM | RESPIRATION RATE: 16 BRPM

## 2023-05-19 DIAGNOSIS — Z00.00 ENCOUNTER FOR WELL ADULT EXAM WITHOUT ABNORMAL FINDINGS: ICD-10-CM

## 2023-05-19 DIAGNOSIS — Z11.4 ENCOUNTER FOR SCREENING FOR HIV: ICD-10-CM

## 2023-05-19 DIAGNOSIS — Z11.59 ENCOUNTER FOR HEPATITIS C SCREENING TEST FOR LOW RISK PATIENT: ICD-10-CM

## 2023-05-19 DIAGNOSIS — E78.00 PURE HYPERCHOLESTEROLEMIA: Primary | ICD-10-CM

## 2023-05-19 DIAGNOSIS — H57.12 LEFT EYE PAIN: ICD-10-CM

## 2023-05-19 DIAGNOSIS — Z12.11 COLON CANCER SCREENING: ICD-10-CM

## 2023-05-19 PROCEDURE — 99214 OFFICE O/P EST MOD 30 MIN: CPT | Performed by: INTERNAL MEDICINE

## 2023-05-19 RX ORDER — DESONIDE 0.5 MG/G
OINTMENT TOPICAL
COMMUNITY
Start: 2023-04-26

## 2023-05-19 SDOH — ECONOMIC STABILITY: HOUSING INSECURITY
IN THE LAST 12 MONTHS, WAS THERE A TIME WHEN YOU DID NOT HAVE A STEADY PLACE TO SLEEP OR SLEPT IN A SHELTER (INCLUDING NOW)?: NO

## 2023-05-19 SDOH — ECONOMIC STABILITY: INCOME INSECURITY: HOW HARD IS IT FOR YOU TO PAY FOR THE VERY BASICS LIKE FOOD, HOUSING, MEDICAL CARE, AND HEATING?: NOT HARD AT ALL

## 2023-05-19 SDOH — ECONOMIC STABILITY: FOOD INSECURITY: WITHIN THE PAST 12 MONTHS, THE FOOD YOU BOUGHT JUST DIDN'T LAST AND YOU DIDN'T HAVE MONEY TO GET MORE.: NEVER TRUE

## 2023-05-19 SDOH — ECONOMIC STABILITY: FOOD INSECURITY: WITHIN THE PAST 12 MONTHS, YOU WORRIED THAT YOUR FOOD WOULD RUN OUT BEFORE YOU GOT MONEY TO BUY MORE.: NEVER TRUE

## 2023-05-19 ASSESSMENT — PATIENT HEALTH QUESTIONNAIRE - PHQ9
2. FEELING DOWN, DEPRESSED OR HOPELESS: 0
SUM OF ALL RESPONSES TO PHQ9 QUESTIONS 1 & 2: 0
SUM OF ALL RESPONSES TO PHQ QUESTIONS 1-9: 0
1. LITTLE INTEREST OR PLEASURE IN DOING THINGS: 0
SUM OF ALL RESPONSES TO PHQ QUESTIONS 1-9: 0

## 2023-05-19 NOTE — PROGRESS NOTES
1. \"Have you been to the ER, urgent care clinic since your last visit? Hospitalized since your last visit? \" No    2. \"Have you seen or consulted any other health care providers outside of the [address] [place] [address] since your last visit? \"       Chung Sims 5     3. For patients aged 39-70: Has the patient had a colonoscopy / FIT/ Cologuard? No      If the patient is female:    4. For patients aged 41-77: Has the patient had a mammogram within the past 2 years? Last week// [facility]      5. For patients aged 21-65: Has the patient had a pap smear?   [address] [facility]

## 2023-05-19 NOTE — PROGRESS NOTES
HISTORY OF PRESENT ILLNESS   Alfa Ortiz   is a 47 y.o.  female. Here for CPE-hx endometriosis, mild HLD low tsh, jehovas witness  Gyn DR Telles-serjio last year  Had mammogram at [facility] recently  Her for 2 months then returns to [facility] [place]  Will see Romaine Ballard MD next week for a n area on right back than bled and scabbed over last month  Saw MD at [facility] recently for left eye pain intermittently-treated in [place] with eye drops for ? Subconjunctival hemorrhage Conjunctivitis. Dx dry eyes at [facility]--exam negative per pt but having recurrent left orbital pain. No eye trauma, redness, only mild b/l crusting this AM. Vision ok. She walks a lot in [facility] ([place]  No other complaints    Nonsmoker  No etoh  Has daughter her in [facility] and mother with Alzheimers    Last OV  Last   Gyn MD--saw Dr Mary Carmen Sorensen this month at [facility] pap and mammogram  Will see DERM toda for skin check  On low fat diet  Walks for exercise  No complaints  Ha abx for travelers diarrhea available  nonsmoker , social etoh  Works in [employer] outbreak in [facility] ([place] and advised to get ab testing . Had MMR in 1970's per pt  1 grown child  Patient Active Problem List    Diagnosis Date Noted    Localized swelling of finger of right hand 11/09/2022    Endometriosis 01/27/2021    Patient is Hindu 01/27/2021    Low TSH level 01/26/2021    History of concussion 09/12/2017    Neck pain 10/27/2016     No current outpatient medications on file. No current facility-administered medications for this visit.      Allergies   Allergen Reactions    Morphine Hives     itch        BMP:   Lab Results   Component Value Date/Time     03/24/2022 08:45 AM    K 4.7 03/24/2022 08:45 AM     03/24/2022 08:45 AM    CO2 21 03/24/2022 08:45 AM    BUN 22 03/24/2022 08:45 AM    CREATININE 0.87 03/24/2022 08:45 AM    GLUCOSE 83 03/24/2022 08:45 AM    CALCIUM 10.2 03/24/2022 08:45 AM      CBC:   Lab Results   Component Value

## 2023-05-20 LAB — HIV 1+2 AB+HIV1 P24 AG SERPL QL IA: NON REACTIVE

## 2023-05-23 LAB
ALBUMIN SERPL-MCNC: 4.9 G/DL (ref 3.8–4.9)
ALBUMIN/GLOB SERPL: 1.8 {RATIO} (ref 1.2–2.2)
ALP SERPL-CCNC: 94 IU/L (ref 44–121)
ALT SERPL-CCNC: 17 IU/L (ref 0–32)
AST SERPL-CCNC: 23 IU/L (ref 0–40)
BASOPHILS # BLD AUTO: 0.1 X10E3/UL (ref 0–0.2)
BASOPHILS NFR BLD AUTO: 1 %
BILIRUB SERPL-MCNC: 0.7 MG/DL (ref 0–1.2)
BUN SERPL-MCNC: 20 MG/DL (ref 6–24)
BUN/CREAT SERPL: 24 (ref 9–23)
CALCIUM SERPL-MCNC: 10 MG/DL (ref 8.7–10.2)
CHLORIDE SERPL-SCNC: 99 MMOL/L (ref 96–106)
CHOLEST SERPL-MCNC: 211 MG/DL (ref 100–199)
CO2 SERPL-SCNC: 23 MMOL/L (ref 20–29)
CREAT SERPL-MCNC: 0.85 MG/DL (ref 0.57–1)
EGFRCR SERPLBLD CKD-EPI 2021: 82 ML/MIN/1.73
EOSINOPHIL # BLD AUTO: 0.2 X10E3/UL (ref 0–0.4)
EOSINOPHIL NFR BLD AUTO: 3 %
ERYTHROCYTE [DISTWIDTH] IN BLOOD BY AUTOMATED COUNT: 12.2 % (ref 11.7–15.4)
GLOBULIN SER CALC-MCNC: 2.8 G/DL (ref 1.5–4.5)
GLUCOSE SERPL-MCNC: 82 MG/DL (ref 70–99)
HCT VFR BLD AUTO: 44.6 % (ref 34–46.6)
HCV IGG SERPL QL IA: NON REACTIVE
HDLC SERPL-MCNC: 65 MG/DL
HGB BLD-MCNC: 14.6 G/DL (ref 11.1–15.9)
HIV 1+2 AB+HIV1 P24 AG SERPL QL IA: NON REACTIVE
IMM GRANULOCYTES # BLD AUTO: 0 X10E3/UL (ref 0–0.1)
IMM GRANULOCYTES NFR BLD AUTO: 0 %
LDLC SERPL CALC-MCNC: 138 MG/DL (ref 0–99)
LYMPHOCYTES # BLD AUTO: 2.1 X10E3/UL (ref 0.7–3.1)
LYMPHOCYTES NFR BLD AUTO: 33 %
MCH RBC QN AUTO: 30 PG (ref 26.6–33)
MCHC RBC AUTO-ENTMCNC: 32.7 G/DL (ref 31.5–35.7)
MCV RBC AUTO: 92 FL (ref 79–97)
MONOCYTES # BLD AUTO: 0.5 X10E3/UL (ref 0.1–0.9)
MONOCYTES NFR BLD AUTO: 8 %
NEUTROPHILS # BLD AUTO: 3.5 X10E3/UL (ref 1.4–7)
NEUTROPHILS NFR BLD AUTO: 55 %
PLATELET # BLD AUTO: 274 X10E3/UL (ref 150–450)
POTASSIUM SERPL-SCNC: 4.8 MMOL/L (ref 3.5–5.2)
PROT SERPL-MCNC: 7.7 G/DL (ref 6–8.5)
RBC # BLD AUTO: 4.86 X10E6/UL (ref 3.77–5.28)
SODIUM SERPL-SCNC: 137 MMOL/L (ref 134–144)
TRIGL SERPL-MCNC: 47 MG/DL (ref 0–149)
TSH SERPL DL<=0.005 MIU/L-ACNC: 0.71 UIU/ML (ref 0.45–4.5)
VLDLC SERPL CALC-MCNC: 8 MG/DL (ref 5–40)
WBC # BLD AUTO: 6.3 X10E3/UL (ref 3.4–10.8)

## 2023-05-25 ENCOUNTER — HOSPITAL ENCOUNTER (OUTPATIENT)
Facility: HOSPITAL | Age: 53
Discharge: HOME | End: 2023-05-25
Payer: MEDICAID

## 2023-05-25 DIAGNOSIS — H57.12 LEFT EYE PAIN: ICD-10-CM

## 2023-05-25 PROCEDURE — 70480 CT ORBIT/EAR/FOSSA W/O DYE: CPT

## 2023-06-01 LAB — NONINV COLON CA DNA+OCC BLD SCRN STL QL: NEGATIVE

## 2024-02-12 ENCOUNTER — OFFICE VISIT (OUTPATIENT)
Age: 54
End: 2024-02-12
Payer: MEDICAID

## 2024-02-12 VITALS
WEIGHT: 149.4 LBS | TEMPERATURE: 96.9 F | SYSTOLIC BLOOD PRESSURE: 121 MMHG | HEIGHT: 65 IN | HEART RATE: 78 BPM | OXYGEN SATURATION: 98 % | DIASTOLIC BLOOD PRESSURE: 80 MMHG | BODY MASS INDEX: 24.89 KG/M2 | RESPIRATION RATE: 16 BRPM

## 2024-02-12 DIAGNOSIS — M25.532 CHRONIC WRIST PAIN, LEFT: Primary | ICD-10-CM

## 2024-02-12 DIAGNOSIS — M65.4 DE QUERVAIN'S TENOSYNOVITIS, LEFT: ICD-10-CM

## 2024-02-12 DIAGNOSIS — Z23 ENCOUNTER FOR IMMUNIZATION: ICD-10-CM

## 2024-02-12 DIAGNOSIS — G89.29 CHRONIC WRIST PAIN, LEFT: Primary | ICD-10-CM

## 2024-02-12 PROCEDURE — 99213 OFFICE O/P EST LOW 20 MIN: CPT | Performed by: INTERNAL MEDICINE

## 2024-02-12 RX ORDER — KETOPROFEN 50 MG/1
50 CAPSULE ORAL 2 TIMES DAILY PRN
COMMUNITY

## 2024-02-12 NOTE — PROGRESS NOTES
HISTORY OF PRESENT ILLNESS   Charlene Ortiz   is a 53 y.o.  female.  C/o left medial wrist pain since October 2023  She had been using her left hand to lift a bucket of water for about a week and subsequently  noted the pain    Had Xray  in [place] for fx or OA  Was given nsaids which upset her stomach and did not help   Had some swelling ? Ganglion which resolved with massage at home  No fall or injury per pt  Patient Active Problem List    Diagnosis Date Noted    Localized swelling of finger of right hand 11/09/2022    Endometriosis 01/27/2021    Patient is Hindu 01/27/2021    Low TSH level 01/26/2021    History of concussion 09/12/2017    Neck pain 10/27/2016     Current Outpatient Medications   Medication Sig Dispense Refill    ketoprofen (ORUDIS) 50 MG capsule Take 1 capsule by mouth 2 times daily as needed for Pain      desonide (DESOWEN) 0.05 % ointment prn       No current facility-administered medications for this visit.     Allergies   Allergen Reactions    Morphine Hives     itch        BMP:   Lab Results   Component Value Date/Time     05/19/2023 12:00 AM    K 4.8 05/19/2023 12:00 AM    CL 99 05/19/2023 12:00 AM    CO2 23 05/19/2023 12:00 AM    BUN 20 05/19/2023 12:00 AM    CREATININE 0.85 05/19/2023 12:00 AM    GLUCOSE 82 05/19/2023 12:00 AM    CALCIUM 10.0 05/19/2023 12:00 AM      CBC:   Lab Results   Component Value Date/Time    WBC 6.3 05/19/2023 12:00 AM    RBC 4.86 05/19/2023 12:00 AM    HGB 14.6 05/19/2023 12:00 AM    HCT 44.6 05/19/2023 12:00 AM    MCV 92 05/19/2023 12:00 AM    MCH 30.0 05/19/2023 12:00 AM    MCHC 32.7 05/19/2023 12:00 AM    RDW 12.2 05/19/2023 12:00 AM     05/19/2023 12:00 AM      Lipids   Lab Results   Component Value Date/Time    CHOL 211 05/19/2023 12:00 AM    CHOL 218 03/24/2022 08:45 AM    TRIG 47 05/19/2023 12:00 AM    HDL 65 05/19/2023 12:00 AM    LDLCALC 138 05/19/2023 12:00 AM     Hemoglobin A1C: No results found for: \"LABA1C\", \"[id number]\"

## 2024-02-12 NOTE — PROGRESS NOTES
\"Have you been to the ER, urgent care clinic since your last visit?  Hospitalized since your last visit?\"    NO    “Have you seen or consulted any other health care providers outside of [facility] since your last visit?”    YES - When: approximately November 18 2023 months ago.  Where and Why: [facility]- X-Ray for Left wrist.        “Have you had a pap smear?”    NO

## 2024-05-20 DIAGNOSIS — E78.5 HYPERLIPIDEMIA, UNSPECIFIED HYPERLIPIDEMIA TYPE: Primary | ICD-10-CM

## 2024-05-21 ENCOUNTER — TELEPHONE (OUTPATIENT)
Age: 54
End: 2024-05-21

## 2024-05-24 DIAGNOSIS — E78.5 HYPERLIPIDEMIA, UNSPECIFIED HYPERLIPIDEMIA TYPE: ICD-10-CM

## 2024-05-25 LAB
ALBUMIN SERPL-MCNC: 4.5 G/DL (ref 3.8–4.9)
ALBUMIN/GLOB SERPL: 1.6 {RATIO} (ref 1.2–2.2)
ALP SERPL-CCNC: 95 IU/L (ref 44–121)
ALT SERPL-CCNC: 15 IU/L (ref 0–32)
AST SERPL-CCNC: 20 IU/L (ref 0–40)
BILIRUB SERPL-MCNC: 0.6 MG/DL (ref 0–1.2)
BUN SERPL-MCNC: 17 MG/DL (ref 6–24)
BUN/CREAT SERPL: 20 (ref 9–23)
CALCIUM SERPL-MCNC: 9.5 MG/DL (ref 8.7–10.2)
CHLORIDE SERPL-SCNC: 100 MMOL/L (ref 96–106)
CHOLEST SERPL-MCNC: 200 MG/DL (ref 100–199)
CO2 SERPL-SCNC: 23 MMOL/L (ref 20–29)
CREAT SERPL-MCNC: 0.83 MG/DL (ref 0.57–1)
EGFRCR SERPLBLD CKD-EPI 2021: 84 ML/MIN/1.73
ERYTHROCYTE [DISTWIDTH] IN BLOOD BY AUTOMATED COUNT: 12.4 % (ref 11.7–15.4)
GLOBULIN SER CALC-MCNC: 2.8 G/DL (ref 1.5–4.5)
GLUCOSE SERPL-MCNC: 90 MG/DL (ref 70–99)
HCT VFR BLD AUTO: 43.5 % (ref 34–46.6)
HDLC SERPL-MCNC: 62 MG/DL
HGB BLD-MCNC: 14.3 G/DL (ref 11.1–15.9)
LDLC SERPL CALC-MCNC: 131 MG/DL (ref 0–99)
MCH RBC QN AUTO: 30.1 PG (ref 26.6–33)
MCHC RBC AUTO-ENTMCNC: 32.9 G/DL (ref 31.5–35.7)
MCV RBC AUTO: 92 FL (ref 79–97)
PLATELET # BLD AUTO: 245 X10E3/UL (ref 150–450)
POTASSIUM SERPL-SCNC: 5.1 MMOL/L (ref 3.5–5.2)
PROT SERPL-MCNC: 7.3 G/DL (ref 6–8.5)
RBC # BLD AUTO: 4.75 X10E6/UL (ref 3.77–5.28)
SODIUM SERPL-SCNC: 136 MMOL/L (ref 134–144)
TRIGL SERPL-MCNC: 36 MG/DL (ref 0–149)
TSH SERPL DL<=0.005 MIU/L-ACNC: 0.53 UIU/ML (ref 0.45–4.5)
VLDLC SERPL CALC-MCNC: 7 MG/DL (ref 5–40)
WBC # BLD AUTO: 5.3 X10E3/UL (ref 3.4–10.8)

## 2024-05-25 NOTE — PROGRESS NOTES
HISTORY OF PRESENT ILLNESS   Charlene Ortiz   is a 54 y.o.  female.  Fu hx endometriosis, mild HLD low tsh, jehovas witness  Gyn Dr Telles  Had recent labs       Had recent thyroid us at [facility]--/ nodule or TM  Denies any neck swelling or compressive symptoms    Had mammogram recently-diagnostic mammogram and US right breast tomorrow scheduled  She has not felt any breast lumps  Saw SUSANNA MARAVILLA recently    She is scheduled to return to [facility] in early June  Feels well overall    Walks 7 miles per day in [place]-not as acutv here in [place]    Nosnoker   Etoh occassional    Last OV  Had mammogram at [facility] recently  Her for 2 months then returns to [place] [facility]  Will see SUSANNA MARAVILLA next week for a n area on right back than bled and scabbed over last month  Saw MD at [facility] recently for left eye pain intermittently-treated in [facility] with eye drops for ? Subconjunctival hemorrhage Conjunctivitis. Dx dry eyes at [place] [facility]--exam negative per pt but having recurrent left orbital pain.  No eye trauma, redness, only mild b/l crusting this AM. Vision ok.  She walks a lot in [place]  No other complaints     Nonsmoker  No etoh  Has daughter her in [facility] and mother with Alzheimers  Patient Active Problem List    Diagnosis Date Noted    Localized swelling of finger of right hand 11/09/2022    Endometriosis 01/27/2021    Patient is Denominational 01/27/2021    Low TSH level 01/26/2021    History of concussion 09/12/2017    Neck pain 10/27/2016     Current Outpatient Medications   Medication Sig Dispense Refill    ketoprofen (ORUDIS) 50 MG capsule Take 1 capsule by mouth 2 times daily as needed for Pain      desonide (DESOWEN) 0.05 % ointment prn       No current facility-administered medications for this visit.     Allergies   Allergen Reactions    Morphine Hives     itch        BMP:   Lab Results   Component Value Date/Time     05/24/2024 12:00 AM    K 5.1 05/24/2024 12:00 AM     05/24/2024 12:00 AM

## 2024-05-28 ENCOUNTER — OFFICE VISIT (OUTPATIENT)
Age: 54
End: 2024-05-28
Payer: MEDICAID

## 2024-05-28 VITALS
WEIGHT: 147.8 LBS | DIASTOLIC BLOOD PRESSURE: 82 MMHG | HEIGHT: 65 IN | BODY MASS INDEX: 24.62 KG/M2 | TEMPERATURE: 97.4 F | SYSTOLIC BLOOD PRESSURE: 110 MMHG | OXYGEN SATURATION: 98 % | RESPIRATION RATE: 14 BRPM | HEART RATE: 76 BPM

## 2024-05-28 DIAGNOSIS — R92.8 ABNORMAL MAMMOGRAM OF RIGHT BREAST: ICD-10-CM

## 2024-05-28 DIAGNOSIS — E01.0 THYROMEGALY: ICD-10-CM

## 2024-05-28 DIAGNOSIS — E78.5 HYPERLIPIDEMIA, UNSPECIFIED HYPERLIPIDEMIA TYPE: Primary | ICD-10-CM

## 2024-05-28 PROCEDURE — 99214 OFFICE O/P EST MOD 30 MIN: CPT | Performed by: INTERNAL MEDICINE

## 2024-05-28 ASSESSMENT — ENCOUNTER SYMPTOMS
ALLERGIC/IMMUNOLOGIC NEGATIVE: 1
RESPIRATORY NEGATIVE: 1
GASTROINTESTINAL NEGATIVE: 1
EYES NEGATIVE: 1

## 2024-05-28 NOTE — PROGRESS NOTES
Room:  I have reviewed all needed documentation in preparation for visit. Verified patient by name and date of birth. Rooming procedures conducted per protocol. Reviewed allergies and medications with patient.   Charlene Ortiz is a 54 y.o. female for Annual Exam     Vitals:    05/28/24 1439   BP: 110/82   Pulse: 76   Resp: 14   Temp: 97.4 °F (36.3 °C)   SpO2: 98%         Patient states that she went for a mammogram two weeks ago and she has been asked to come back for a follow up due to something being seen. She is following up tomorrow. Patient went to OBGYN for annual on 5/2 and was told she needed an u/s on thyroid. She got u/s on 5/23 but has not gotten results. Patient went to [facility].       Health Maintenance Due   Topic Date Due    Hepatitis B vaccine (1 of 3 - 3-dose series) Never done    Cervical cancer screen  Never done    Shingles vaccine (2 of 2) 08/01/2023    COVID-19 Vaccine (5 - 2023-24 season) 09/01/2023        \"Have you been to the ER, urgent care clinic since your last visit?  Hospitalized since your last visit?\"    NO    “Have you seen or consulted any other health care providers outside of [facility] since your last visit?”    NO        “Have you had a pap smear?”    YES - Where: 5/2/2024 [facility].  Nurse/CMA to request most recent records if not in the chart    No cervical cancer screening on file
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
## 2025-06-12 NOTE — PROGRESS NOTES
HISTORY OF PRESENT ILLNESS   Charlene Ortiz   is a 55 y.o.  female.  Hx endometriosis, mild HLD low tsh, jehovas witness  Gyn Dr Telles    C/o large knot on right shoulder getting larger and painful  She flew [place] from [place] last week  2 d after reutrning home had mild soreness of left upper chest below the clavicle  The next days a large knot formed  Now the knot is redn and tender-no drainagen  No f/c  She was carrying a back akbar in [place] but no skin breakdown  Has not been in hospitals or long tern care facilities   No hx MRSA skin infections although had a red lesion ? Bite on right thigh recently that resolved    Patient Active Problem List    Diagnosis Date Noted    Localized swelling of finger of right hand 11/09/2022    Endometriosis 01/27/2021    Patient is Buddhism 01/27/2021    Low TSH level 01/26/2021    History of concussion 09/12/2017    Neck pain 10/27/2016     Current Outpatient Medications   Medication Sig Dispense Refill    desonide (DESOWEN) 0.05 % ointment prn       No current facility-administered medications for this visit.     Allergies   Allergen Reactions    Morphine Hives     itch     Social History     Tobacco Use    Smoking status: Never    Smokeless tobacco: Never   Substance Use Topics    Alcohol use: Yes     Alcohol/week: 4.0 standard drinks of alcohol     Types: 4 Glasses of wine per week        BMP:   Lab Results   Component Value Date/Time     05/24/2024 12:00 AM    K 5.1 05/24/2024 12:00 AM     05/24/2024 12:00 AM    CO2 23 05/24/2024 12:00 AM    BUN 17 05/24/2024 12:00 AM    CREATININE 0.83 05/24/2024 12:00 AM    GLUCOSE 90 05/24/2024 12:00 AM    CALCIUM 9.5 05/24/2024 12:00 AM      CBC:   Lab Results   Component Value Date/Time    WBC 5.3 05/24/2024 12:00 AM    RBC 4.75 05/24/2024 12:00 AM    HGB 14.3 05/24/2024 12:00 AM    HCT 43.5 05/24/2024 12:00 AM    MCV 92 05/24/2024 12:00 AM    MCH 30.1 05/24/2024 12:00 AM    MCHC 32.9 05/24/2024 12:00 AM    RDW 12.4

## 2025-06-13 ENCOUNTER — OFFICE VISIT (OUTPATIENT)
Age: 55
End: 2025-06-13
Payer: MEDICAID

## 2025-06-13 VITALS
RESPIRATION RATE: 16 BRPM | SYSTOLIC BLOOD PRESSURE: 110 MMHG | OXYGEN SATURATION: 99 % | TEMPERATURE: 97.9 F | BODY MASS INDEX: 23.89 KG/M2 | DIASTOLIC BLOOD PRESSURE: 76 MMHG | WEIGHT: 143.4 LBS | HEIGHT: 65 IN | HEART RATE: 80 BPM

## 2025-06-13 DIAGNOSIS — E78.00 PURE HYPERCHOLESTEROLEMIA: ICD-10-CM

## 2025-06-13 DIAGNOSIS — L02.213 ABSCESS OF CHEST WALL: Primary | ICD-10-CM

## 2025-06-13 PROCEDURE — 99213 OFFICE O/P EST LOW 20 MIN: CPT | Performed by: INTERNAL MEDICINE

## 2025-06-13 RX ORDER — SULFAMETHOXAZOLE AND TRIMETHOPRIM 800; 160 MG/1; MG/1
1 TABLET ORAL 2 TIMES DAILY
Qty: 14 TABLET | Refills: 0 | Status: SHIPPED | OUTPATIENT
Start: 2025-06-13 | End: 2025-06-20

## 2025-06-13 SDOH — ECONOMIC STABILITY: FOOD INSECURITY: WITHIN THE PAST 12 MONTHS, THE FOOD YOU BOUGHT JUST DIDN'T LAST AND YOU DIDN'T HAVE MONEY TO GET MORE.: NEVER TRUE

## 2025-06-13 SDOH — ECONOMIC STABILITY: FOOD INSECURITY: WITHIN THE PAST 12 MONTHS, YOU WORRIED THAT YOUR FOOD WOULD RUN OUT BEFORE YOU GOT MONEY TO BUY MORE.: NEVER TRUE

## 2025-06-13 ASSESSMENT — PATIENT HEALTH QUESTIONNAIRE - PHQ9
SUM OF ALL RESPONSES TO PHQ QUESTIONS 1-9: 0
1. LITTLE INTEREST OR PLEASURE IN DOING THINGS: NOT AT ALL
SUM OF ALL RESPONSES TO PHQ QUESTIONS 1-9: 0
2. FEELING DOWN, DEPRESSED OR HOPELESS: NOT AT ALL
SUM OF ALL RESPONSES TO PHQ QUESTIONS 1-9: 0
SUM OF ALL RESPONSES TO PHQ QUESTIONS 1-9: 0

## 2025-06-13 NOTE — PROGRESS NOTES
Have you been to the ER, urgent care clinic since your last visit?  Hospitalized since your last visit?   NO    Have you seen or consulted any other health care providers outside our system since your last visit?   NO    Have you had a mammogram?”   Scheduled 06-    Date of last Mammogram: 5/15/2023      “Have you had a pap smear?”    July 2025    No cervical cancer screening on file

## 2025-06-17 ENCOUNTER — OFFICE VISIT (OUTPATIENT)
Age: 55
End: 2025-06-17
Payer: MEDICAID

## 2025-06-17 VITALS
TEMPERATURE: 97.5 F | HEIGHT: 65 IN | WEIGHT: 144.2 LBS | OXYGEN SATURATION: 98 % | DIASTOLIC BLOOD PRESSURE: 71 MMHG | BODY MASS INDEX: 24.03 KG/M2 | SYSTOLIC BLOOD PRESSURE: 110 MMHG | HEART RATE: 82 BPM

## 2025-06-17 DIAGNOSIS — L02.91 ABSCESS: Primary | ICD-10-CM

## 2025-06-17 PROCEDURE — 99203 OFFICE O/P NEW LOW 30 MIN: CPT | Performed by: SURGERY

## 2025-06-17 ASSESSMENT — PATIENT HEALTH QUESTIONNAIRE - PHQ9
SUM OF ALL RESPONSES TO PHQ QUESTIONS 1-9: 0
1. LITTLE INTEREST OR PLEASURE IN DOING THINGS: NOT AT ALL
2. FEELING DOWN, DEPRESSED OR HOPELESS: NOT AT ALL

## 2025-06-17 NOTE — PROGRESS NOTES
Identified pt with two pt identifiers (name and ). Reviewed chart in preparation for visit and have obtained necessary documentation.    Charlene Ortiz is a 55 y.o. female  Chief Complaint   Patient presents with    Skin Problem     abscess chest wall, kait ryan,     /71 (BP Site: Left Upper Arm, Patient Position: Sitting, BP Cuff Size: Medium Adult)   Pulse 82   Temp 97.5 °F (36.4 °C) (Temporal)   Ht 1.651 m (5' 5\")   Wt 65.4 kg (144 lb 3.2 oz)   SpO2 98%   BMI 24.00 kg/m²     1. Have you been to the ER, urgent care clinic since your last visit?  Hospitalized since your last visit?no    2. Have you seen or consulted any other health care providers outside of the [facility] since your last visit?  Include any pap smears or colon screening. no

## 2025-06-17 NOTE — PROGRESS NOTES
Subjective:       Charlene Ortiz is a 55 y.o. female who presents for evaluation of infected cyst on her right shoulder. She saw her PCP and placed on her antibiotic. She just came back from [place]. No history of lumps/bumps in the area prior to this infection.       Past Medical History:   Diagnosis Date    Previous back surgery 1995 & 1999     Past Surgical History:   Procedure Laterality Date    CHOLECYSTECTOMY      GYN  2004    hysterectomy    ORTHOPEDIC SURGERY      back surgery x2 --lumbar fusion and repair    PARTIAL HYSTERECTOMY (CERVIX NOT REMOVED)      MO UNLISTED PROCEDURE ABDOMEN PERITONEUM & OMENTUM  2005    gallbladder     Social History     Socioeconomic History    Marital status:    Tobacco Use    Smoking status: Never    Smokeless tobacco: Never   Substance and Sexual Activity    Alcohol use: Yes     Alcohol/week: 4.0 standard drinks of alcohol     Types: 4 Glasses of wine per week    Drug use: No    Sexual activity: Never   Social History Narrative    ** Merged History Encounter **        Social Drivers of Health     Financial Resource Strain: Low Risk  (5/19/2023)    Overall Financial Resource Strain (CARDIA)     Difficulty of Paying Living Expenses: Not hard at all   Food Insecurity: No Food Insecurity (6/13/2025)    Hunger Vital Sign     Worried About Running Out of Food in the Last Year: Never true     Ran Out of Food in the Last Year: Never true   Transportation Needs: No Transportation Needs (6/13/2025)    PRAPARE - Transportation     Lack of Transportation (Medical): No     Lack of Transportation (Non-Medical): No   Housing Stability: Low Risk  (6/13/2025)    Housing Stability Vital Sign     Unable to Pay for Housing in the Last Year: No     Number of Times Moved in the Last Year: 1     Homeless in the Last Year: No     Current Outpatient Medications   Medication Sig Dispense Refill    sulfamethoxazole-trimethoprim (BACTRIM DS;SEPTRA DS) 800-160 MG per tablet Take 1 tablet by mouth 2

## 2025-06-24 ENCOUNTER — HOSPITAL ENCOUNTER (OUTPATIENT)
Facility: HOSPITAL | Age: 55
Discharge: HOME | End: 2025-06-27

## 2025-06-24 DIAGNOSIS — E78.00 PURE HYPERCHOLESTEROLEMIA: ICD-10-CM

## 2025-06-25 LAB
ALBUMIN SERPL-MCNC: 4.4 G/DL (ref 3.8–4.9)
ALP SERPL-CCNC: 102 IU/L (ref 44–121)
ALT SERPL-CCNC: 14 IU/L (ref 0–32)
AST SERPL-CCNC: 23 IU/L (ref 0–40)
BILIRUB SERPL-MCNC: 0.3 MG/DL (ref 0–1.2)
BUN SERPL-MCNC: 15 MG/DL (ref 6–24)
BUN/CREAT SERPL: 17 (ref 9–23)
CALCIUM SERPL-MCNC: 9.7 MG/DL (ref 8.7–10.2)
CHLORIDE SERPL-SCNC: 102 MMOL/L (ref 96–106)
CHOLEST SERPL-MCNC: 193 MG/DL (ref 100–199)
CO2 SERPL-SCNC: 19 MMOL/L (ref 20–29)
CREAT SERPL-MCNC: 0.86 MG/DL (ref 0.57–1)
EGFRCR SERPLBLD CKD-EPI 2021: 80 ML/MIN/1.73
ERYTHROCYTE [DISTWIDTH] IN BLOOD BY AUTOMATED COUNT: 12.7 % (ref 11.7–15.4)
GLOBULIN SER CALC-MCNC: 2.9 G/DL (ref 1.5–4.5)
GLUCOSE SERPL-MCNC: 89 MG/DL (ref 70–99)
HCT VFR BLD AUTO: 45.1 % (ref 34–46.6)
HDLC SERPL-MCNC: 58 MG/DL
HGB BLD-MCNC: 14.6 G/DL (ref 11.1–15.9)
LDLC SERPL CALC-MCNC: 125 MG/DL (ref 0–99)
MCH RBC QN AUTO: 30.6 PG (ref 26.6–33)
MCHC RBC AUTO-ENTMCNC: 32.4 G/DL (ref 31.5–35.7)
MCV RBC AUTO: 95 FL (ref 79–97)
PLATELET # BLD AUTO: 256 X10E3/UL (ref 150–450)
POTASSIUM SERPL-SCNC: 4.9 MMOL/L (ref 3.5–5.2)
PROT SERPL-MCNC: 7.3 G/DL (ref 6–8.5)
RBC # BLD AUTO: 4.77 X10E6/UL (ref 3.77–5.28)
SODIUM SERPL-SCNC: 139 MMOL/L (ref 134–144)
TRIGL SERPL-MCNC: 56 MG/DL (ref 0–149)
TSH SERPL DL<=0.005 MIU/L-ACNC: 0.75 UIU/ML (ref 0.45–4.5)
VLDLC SERPL CALC-MCNC: 10 MG/DL (ref 5–40)
WBC # BLD AUTO: 6.4 X10E3/UL (ref 3.4–10.8)

## 2025-07-01 ENCOUNTER — OFFICE VISIT (OUTPATIENT)
Age: 55
End: 2025-07-01
Payer: MEDICAID

## 2025-07-01 VITALS
HEIGHT: 65 IN | SYSTOLIC BLOOD PRESSURE: 106 MMHG | WEIGHT: 144 LBS | TEMPERATURE: 97.3 F | BODY MASS INDEX: 23.99 KG/M2 | HEART RATE: 89 BPM | DIASTOLIC BLOOD PRESSURE: 78 MMHG | OXYGEN SATURATION: 99 % | RESPIRATION RATE: 16 BRPM

## 2025-07-01 DIAGNOSIS — L98.9 SKIN LESION OF BACK: ICD-10-CM

## 2025-07-01 DIAGNOSIS — E78.5 HYPERLIPIDEMIA, UNSPECIFIED HYPERLIPIDEMIA TYPE: ICD-10-CM

## 2025-07-01 DIAGNOSIS — Z12.11 COLON CANCER SCREENING: Primary | ICD-10-CM

## 2025-07-01 PROCEDURE — 99214 OFFICE O/P EST MOD 30 MIN: CPT | Performed by: INTERNAL MEDICINE

## 2025-07-01 PROCEDURE — 99396 PREV VISIT EST AGE 40-64: CPT | Performed by: INTERNAL MEDICINE

## 2025-07-01 SDOH — ECONOMIC STABILITY: FOOD INSECURITY: WITHIN THE PAST 12 MONTHS, THE FOOD YOU BOUGHT JUST DIDN'T LAST AND YOU DIDN'T HAVE MONEY TO GET MORE.: NEVER TRUE

## 2025-07-01 SDOH — ECONOMIC STABILITY: FOOD INSECURITY: WITHIN THE PAST 12 MONTHS, YOU WORRIED THAT YOUR FOOD WOULD RUN OUT BEFORE YOU GOT MONEY TO BUY MORE.: NEVER TRUE

## 2025-07-01 ASSESSMENT — PATIENT HEALTH QUESTIONNAIRE - PHQ9
SUM OF ALL RESPONSES TO PHQ QUESTIONS 1-9: 0
2. FEELING DOWN, DEPRESSED OR HOPELESS: NOT AT ALL
1. LITTLE INTEREST OR PLEASURE IN DOING THINGS: NOT AT ALL

## 2025-07-01 NOTE — PROGRESS NOTES
Have you been to the ER, urgent care clinic since your last visit?  Hospitalized since your last visit?   NO    Have you seen or consulted any other health care providers outside our system since your last visit?   NO    Have you had a mammogram?”   07/03/25 scheduled mammogram    Date of last Mammogram: 5/15/2023      “Have you had a pap smear?”    07/2025 end of the month scheduled for PAP    No cervical cancer screening on file            
alert.   Psychiatric:         Mood and Affect: Mood normal.         Behavior: Behavior normal.         Thought Content: Thought content normal.         Judgment: Judgment normal.          ASSESSMENT and PLAN  There are no diagnoses linked to this encounter.   Charlene was seen today for annual exam.    Diagnoses and all orders for this visit:    Colon cancer screening  -     External Referral To Gastroenterology  -     [facility] - Shreyas Whittington MD, Gastroenterology, [place]   Due next year-d/w pt  Hyperlipidemia, unspecified hyperlipidemia type   Low ACC 10 yr risk 1.1 %   Manage with diet and exercise  Skin lesion of back   DERM appt today-probably needs bx or excision  Preventive   Recommended PCV 20   Completed shingrix per pt   Will get mammogram    RTC 1 year CPE  RTC 1 year CPE     Tobias Delgado MD

## 2025-08-08 ENCOUNTER — OFFICE VISIT (OUTPATIENT)
Age: 55
End: 2025-08-08
Payer: MEDICAID

## 2025-08-08 VITALS
TEMPERATURE: 97 F | HEART RATE: 83 BPM | OXYGEN SATURATION: 98 % | HEIGHT: 65 IN | RESPIRATION RATE: 16 BRPM | BODY MASS INDEX: 24.46 KG/M2 | WEIGHT: 146.8 LBS | SYSTOLIC BLOOD PRESSURE: 112 MMHG | DIASTOLIC BLOOD PRESSURE: 82 MMHG

## 2025-08-08 DIAGNOSIS — R49.0 VOICE HOARSENESSES: ICD-10-CM

## 2025-08-08 DIAGNOSIS — E04.9 GOITER: Primary | ICD-10-CM

## 2025-08-08 DIAGNOSIS — E04.2 MULTIPLE THYROID NODULES: ICD-10-CM

## 2025-08-08 DIAGNOSIS — M54.2 TENDERNESS OF NECK: ICD-10-CM

## 2025-08-08 DIAGNOSIS — R13.12 OROPHARYNGEAL DYSPHAGIA: ICD-10-CM

## 2025-08-08 PROCEDURE — 99214 OFFICE O/P EST MOD 30 MIN: CPT | Performed by: INTERNAL MEDICINE

## 2025-08-08 RX ORDER — CETIRIZINE HYDROCHLORIDE 10 MG/1
10 TABLET ORAL DAILY
COMMUNITY

## 2025-08-08 SDOH — ECONOMIC STABILITY: FOOD INSECURITY: WITHIN THE PAST 12 MONTHS, THE FOOD YOU BOUGHT JUST DIDN'T LAST AND YOU DIDN'T HAVE MONEY TO GET MORE.: NEVER TRUE

## 2025-08-08 SDOH — ECONOMIC STABILITY: FOOD INSECURITY: WITHIN THE PAST 12 MONTHS, YOU WORRIED THAT YOUR FOOD WOULD RUN OUT BEFORE YOU GOT MONEY TO BUY MORE.: NEVER TRUE

## 2025-08-08 ASSESSMENT — PATIENT HEALTH QUESTIONNAIRE - PHQ9
SUM OF ALL RESPONSES TO PHQ QUESTIONS 1-9: 0
1. LITTLE INTEREST OR PLEASURE IN DOING THINGS: NOT AT ALL
SUM OF ALL RESPONSES TO PHQ QUESTIONS 1-9: 0
SUM OF ALL RESPONSES TO PHQ QUESTIONS 1-9: 0
2. FEELING DOWN, DEPRESSED OR HOPELESS: NOT AT ALL
SUM OF ALL RESPONSES TO PHQ QUESTIONS 1-9: 0

## 2025-08-14 ENCOUNTER — HOSPITAL ENCOUNTER (OUTPATIENT)
Facility: HOSPITAL | Age: 55
Discharge: HOME | End: 2025-08-17
Attending: INTERNAL MEDICINE
Payer: MEDICAID

## 2025-08-14 DIAGNOSIS — E04.2 MULTIPLE THYROID NODULES: ICD-10-CM

## 2025-08-14 DIAGNOSIS — M54.2 TENDERNESS OF NECK: ICD-10-CM

## 2025-08-14 DIAGNOSIS — E04.9 GOITER: ICD-10-CM

## 2025-08-14 PROCEDURE — 76536 US EXAM OF HEAD AND NECK: CPT

## 2025-08-22 ENCOUNTER — RESULTS FOLLOW-UP (OUTPATIENT)
Age: 55
End: 2025-08-22